# Patient Record
(demographics unavailable — no encounter records)

---

## 2024-12-06 NOTE — CONSULT LETTER
[Dear  ___] : Dear  [unfilled], [Courtesy Letter:] : I had the pleasure of seeing your patient, [unfilled], in my office today. [Please see my note below.] : Please see my note below. [Consult Closing:] : Thank you very much for allowing me to participate in the care of this patient.  If you have any questions, please do not hesitate to contact me. [Sincerely,] : Sincerely, [FreeTextEntry3] : Deniz Ramirez MD, FACS [DrKay  ___] : Dr. LU

## 2024-12-06 NOTE — HISTORY OF PRESENT ILLNESS
[de-identified] : 59-year-old male status post robotic assisted cholecystectomy for acute necrotizing gangrenous cholecystitis with cholelithiasis mid-November.  Pathology demonstrates the same.  Patient is doing well tolerating a diet.  He had some looser stools for a few days but that has resolved completely and he has tolerating regular food.  His sugars are much improved and he has been into see primary care.  He is very appreciative and is feeling well.  Activity level is essentially normal but he is having some problems with foot drop from his diabetes.  All questions were answered.  Incisions healing nicely.